# Patient Record
(demographics unavailable — no encounter records)

---

## 2024-12-06 NOTE — HISTORY OF PRESENT ILLNESS
[FreeTextEntry1] : 52 yo M with HTN, chronic hyponatremia, hemochromatosis with stable, asymptomatic HIV. He was previously on Atripla regimen. He switched to BIC/FTC/TAF in 11/2023.  When seen on 6/23/24, he had CD4 723, 30%, VL UD, Na 131.  He is feeling well, reports 100% compliance with Biktarvy.  Last sex ~9 months ago, uses barrier protection 100% of the time.

## 2024-12-06 NOTE — PHYSICAL EXAM
[General Appearance - Alert] : alert [General Appearance - In No Acute Distress] : in no acute distress [General Appearance - Well-Appearing] : healthy appearing [Sclera] : the sclera and conjunctiva were normal [PERRL With Normal Accommodation] : pupils were equal in size, round, reactive to light [Outer Ear] : the ears and nose were normal in appearance [Examination Of The Oral Cavity] : the lips and gums were normal [Oropharynx] : the oropharynx was normal with no thrush [Auscultation Breath Sounds / Voice Sounds] : lungs were clear to auscultation bilaterally [Heart Rate And Rhythm] : heart rate was normal and rhythm regular [Heart Sounds] : normal S1 and S2 [Murmurs] : no murmurs [Edema] : there was no peripheral edema [Bowel Sounds] : normal bowel sounds [Abdomen Soft] : soft [Abdomen Tenderness] : non-tender [Costovertebral Angle Tenderness] : no CVA tenderness [No Palpable Adenopathy] : no palpable adenopathy [Musculoskeletal - Swelling] : no joint swelling [Skin Color & Pigmentation] : normal skin color and pigmentation [] : no rash

## 2024-12-06 NOTE — REVIEW OF SYSTEMS
[Fever] : no fever [Chills] : no chills [Eye Pain] : no eye pain [Nasal Discharge] : no nasal discharge [Sore Throat] : no sore throat [Chest Pain] : no chest pain [Shortness Of Breath] : no shortness of breath [Cough] : no cough [Sputum] : not coughing up ~M sputum [Abdominal Pain] : no abdominal pain [Vomiting] : no vomiting [Diarrhea] : no diarrhea [Dysuria] : no dysuria [Penile Discharge] : no penile discharge [Joint Pain] : no joint pain [Skin Lesions] : no skin lesions

## 2024-12-06 NOTE — ASSESSMENT
[FreeTextEntry1] : 54 yo M with HTN, chronic hyponatremia, hemochromatosis with stable, asymptomatic HIV. He is doing well clinically, no intercurrent issues. Plan: - CBC, CMP, HIV VL, T cell subsets - drawn and sent - Continue BIC/FTC/TAF Will call with results.  F/U 6 months.  He was encouraged to call with questions/concerns.

## 2025-06-13 NOTE — REVIEW OF SYSTEMS
[As Noted in HPI] : as noted in HPI [Eye Pain] : no eye pain [Nasal Discharge] : no nasal discharge [Sore Throat] : no sore throat [Chest Pain] : no chest pain [Shortness Of Breath] : no shortness of breath [Cough] : no cough [Sputum] : not coughing up ~M sputum [Abdominal Pain] : no abdominal pain [Diarrhea] : no diarrhea [Dysuria] : no dysuria [Joint Pain] : no joint pain [Skin Lesions] : no skin lesions

## 2025-06-13 NOTE — HISTORY OF PRESENT ILLNESS
[FreeTextEntry1] : 53 yo M with HTN, chronic hyponatremia, hemochromatosis with stable, asymptomatic HIV. He was previously on Atripla regimen. He switched to BIC/FTC/TAF in 11/2023. When seen on 12/6/24, he had CD4 846, 53%, VL UD, Na 133. He is feeling well, reports 100% compliance with Biktarvy.  He reports 100% compliance.  Not sexually active.  No fever, chills, night sweats.  Insurance issues were worked out.

## 2025-06-13 NOTE — ASSESSMENT
[FreeTextEntry1] : Asymptomatic HIV Plan: - CBC, CMP, T cell subsets, HIV VL - drawn and sent from office - Continue BIC/FTC/TAF qd Will call with results.  F/U 6 months.  He was encouraged to call with questions/concerns.